# Patient Record
Sex: MALE | Race: OTHER
[De-identification: names, ages, dates, MRNs, and addresses within clinical notes are randomized per-mention and may not be internally consistent; named-entity substitution may affect disease eponyms.]

---

## 2020-05-14 ENCOUNTER — HOSPITAL ENCOUNTER (OUTPATIENT)
Dept: HOSPITAL 72 - PAN | Age: 65
Discharge: HOME | End: 2020-05-14
Payer: MEDICARE

## 2020-05-14 VITALS — SYSTOLIC BLOOD PRESSURE: 125 MMHG | DIASTOLIC BLOOD PRESSURE: 50 MMHG

## 2020-05-14 VITALS — BODY MASS INDEX: 23.14 KG/M2 | HEIGHT: 66 IN | WEIGHT: 144 LBS

## 2020-05-14 DIAGNOSIS — E11.9: ICD-10-CM

## 2020-05-14 DIAGNOSIS — Z79.899: ICD-10-CM

## 2020-05-14 DIAGNOSIS — R63.4: Primary | ICD-10-CM

## 2020-05-14 DIAGNOSIS — Z79.84: ICD-10-CM

## 2020-05-15 NOTE — CONSULTATION
DATE OF CONSULTATION:  05/14/2020

CONSULTING PHYSICIAN:  Jani Singleton MD.



CHIEF COMPLAINT:  Weight loss.



HISTORY OF PRESENT ILLNESS:  This is a very pleasant 65-year-old [place]

male with past medical history of diabetes.  Apparently, he has been

losing weight.  He ran out by almost 20 pounds, slowly gradually gaining

some of the weight.  Denies any significant abdominal pain.  No nausea.

No vomiting.  No dysphagia.  No odynophagia.  He never had endoscopy and

no colonoscopy.



PAST MEDICAL HISTORY:  History of diabetes.



PAST SURGICAL HISTORY:  History of leg varicose vein surgery.



MEDICATIONS:  Glipizide, metformin, and Januvia.



FAMILY HISTORY:  No family history of GI malignancies.



SOCIAL HISTORY:  The patient denies any tobacco, alcohol, or IV drug

abuse.



ALLERGIES:  No known drug allergies.



REVIEW OF SYSTEMS:  Grossly except for weight loss has been

negative.



PHYSICAL EXAMINATION:

VITAL SIGNS:  Temperature 98.7, pulse 67, respirations 20, blood pressure

125/50.

HEENT:  Normocephalic and atraumatic.  Sclerae anicteric.

NECK:  Supple.  No evidence of obvious lymphadenopathy.

CARDIOVASCULAR:  Regular rate and rhythm.  Plus S1, S2.  No obvious

murmur.

LUNGS:  Clear to auscultation bilaterally.

ABDOMEN:  Positive bowel sounds.  Soft and nontender.  No rebound.  No

guarding.  No peritoneal sign.

EXTREMITIES:  No cyanosis.  No clubbing.  No edema.



ASSESSMENT AND PLAN:  This is a 65-year-old male.  He never had a

colonoscopy, so needs a screening colonoscopy.  He also complained of

weight loss of unknown etiology.  Plan to do EGD and colonoscopy.  The

patient was given instruction for colonoscopy.  The risks and benefits of

procedure were explained to him.  He will call us for scheduling.



If the above is negative, we will be recommending doing CT scan for

evaluation of weight loss.



We will also recommend getting lab on the procedure day including

thyroid panel, CBC, CMP, and tumor markers.









  ______________________________________________

  Jani Singleton M.D.





DR:  Hipolito

D:  05/14/2020 14:52

T:  05/14/2020 23:24

JOB#:  [id number]

CC: